# Patient Record
Sex: MALE | URBAN - METROPOLITAN AREA
[De-identification: names, ages, dates, MRNs, and addresses within clinical notes are randomized per-mention and may not be internally consistent; named-entity substitution may affect disease eponyms.]

---

## 2021-09-08 ENCOUNTER — OFFICE VISIT (OUTPATIENT)
Dept: URBAN - METROPOLITAN AREA CLINIC 98 | Facility: CLINIC | Age: 51
End: 2021-09-08
Payer: COMMERCIAL

## 2021-09-08 DIAGNOSIS — R10.11 RUQ ABDOMINAL PAIN: ICD-10-CM

## 2021-09-08 DIAGNOSIS — K52.9 CHRONIC DIARRHEA: ICD-10-CM

## 2021-09-08 DIAGNOSIS — K21.9 GERD WITHOUT ESOPHAGITIS: ICD-10-CM

## 2021-09-08 DIAGNOSIS — Z12.11 COLON CANCER SCREENING: ICD-10-CM

## 2021-09-08 PROCEDURE — 99203 OFFICE O/P NEW LOW 30 MIN: CPT | Performed by: INTERNAL MEDICINE

## 2021-09-08 PROCEDURE — 99243 OFF/OP CNSLTJ NEW/EST LOW 30: CPT | Performed by: INTERNAL MEDICINE

## 2021-09-08 RX ORDER — SULFAMETHOXAZOLE AND TRIMETHOPRIM 800; 160 MG/1; MG/1
1 TABLET TABLET ORAL TWICE A DAY
Status: ACTIVE | COMMUNITY

## 2021-09-08 RX ORDER — SODIUM SULFATE, MAGNESIUM SULFATE, AND POTASSIUM CHLORIDE 17.75; 2.7; 2.25 G/1; G/1; G/1
12 TABLETS TABLET ORAL
Qty: 24 TABLETS | Refills: 0 | OUTPATIENT
Start: 2021-09-08 | End: 2021-09-09

## 2021-09-08 NOTE — HPI-TODAY'S VISIT:
Patient referred by Poonam Dueñas NP for evaluation of chronic MARCELO and change in bowel pattern. Copy of this consult OV sent to Poonam Dueñas NP. 50 yo pt w/ chronic day and night time MARCELO, worst when off PPI ( partially controlled w/ OTC PPI ), w/o dysphagia /odynophagia and no melenic stools. No anorexia or weight loss. Has gained ~ 6 lbs / past few mos, No cardiorespiratory sxs. No edema nor WITT, SOB. Chronic loose, non-bloody stools, 2 to 3 /d, w/o nocturnal diarrhea, and occasional blood in stools, w/o melenic stools nor juliann hematochezia. Has no FHx CC / IBD / pp / GI malignancies. Normal labs 3 mos ago. Had COVID-19 12/20 and hasn't received COVID-19 vaccine. No other complaints.  No prior screening colonoscopy.

## 2021-09-20 ENCOUNTER — TELEPHONE ENCOUNTER (OUTPATIENT)
Dept: URBAN - METROPOLITAN AREA CLINIC 23 | Facility: CLINIC | Age: 51
End: 2021-09-20

## 2021-10-05 ENCOUNTER — OFFICE VISIT (OUTPATIENT)
Dept: URBAN - METROPOLITAN AREA CLINIC 97 | Facility: CLINIC | Age: 51
End: 2021-10-05
Payer: COMMERCIAL

## 2021-10-05 DIAGNOSIS — K76.0 FATTY (CHANGE OF) LIVER: ICD-10-CM

## 2021-10-05 PROCEDURE — 76705 ECHO EXAM OF ABDOMEN: CPT | Performed by: INTERNAL MEDICINE

## 2021-10-05 RX ORDER — SULFAMETHOXAZOLE AND TRIMETHOPRIM 800; 160 MG/1; MG/1
1 TABLET TABLET ORAL TWICE A DAY
Status: ACTIVE | COMMUNITY

## 2021-10-18 ENCOUNTER — CLAIMS CREATED FROM THE CLAIM WINDOW (OUTPATIENT)
Dept: URBAN - METROPOLITAN AREA CLINIC 4 | Facility: CLINIC | Age: 51
End: 2021-10-18
Payer: COMMERCIAL

## 2021-10-18 ENCOUNTER — OFFICE VISIT (OUTPATIENT)
Dept: URBAN - METROPOLITAN AREA SURGERY CENTER 18 | Facility: SURGERY CENTER | Age: 51
End: 2021-10-18
Payer: COMMERCIAL

## 2021-10-18 DIAGNOSIS — K90.0 CELIAC DISEASE: ICD-10-CM

## 2021-10-18 DIAGNOSIS — K29.60 ADENOPAPILLOMATOSIS GASTRICA: ICD-10-CM

## 2021-10-18 DIAGNOSIS — K90.1 SPRUE: ICD-10-CM

## 2021-10-18 DIAGNOSIS — K21.9 GASTRO-ESOPHAGEAL REFLUX DISEASE WITHOUT ESOPHAGITIS: ICD-10-CM

## 2021-10-18 DIAGNOSIS — K63.89 POLYP, HYPERPLASTIC: ICD-10-CM

## 2021-10-18 DIAGNOSIS — K63.5 BENIGN COLON POLYP: ICD-10-CM

## 2021-10-18 DIAGNOSIS — K21.9 ACID REFLUX: ICD-10-CM

## 2021-10-18 DIAGNOSIS — K29.40 CHRONIC ATROPHIC GASTRITIS WITHOUT BLEEDING: ICD-10-CM

## 2021-10-18 PROCEDURE — 45380 COLONOSCOPY AND BIOPSY: CPT | Performed by: INTERNAL MEDICINE

## 2021-10-18 PROCEDURE — 88342 IMHCHEM/IMCYTCHM 1ST ANTB: CPT | Performed by: PATHOLOGY

## 2021-10-18 PROCEDURE — 43239 EGD BIOPSY SINGLE/MULTIPLE: CPT | Performed by: INTERNAL MEDICINE

## 2021-10-18 PROCEDURE — 88305 TISSUE EXAM BY PATHOLOGIST: CPT | Performed by: PATHOLOGY

## 2021-10-18 PROCEDURE — G8907 PT DOC NO EVENTS ON DISCHARG: HCPCS | Performed by: INTERNAL MEDICINE

## 2021-10-18 PROCEDURE — 88312 SPECIAL STAINS GROUP 1: CPT | Performed by: PATHOLOGY

## 2022-01-25 ENCOUNTER — OFFICE VISIT (OUTPATIENT)
Dept: URBAN - METROPOLITAN AREA CLINIC 98 | Facility: CLINIC | Age: 52
End: 2022-01-25
Payer: COMMERCIAL

## 2022-01-25 ENCOUNTER — LAB OUTSIDE AN ENCOUNTER (OUTPATIENT)
Dept: URBAN - METROPOLITAN AREA CLINIC 98 | Facility: CLINIC | Age: 52
End: 2022-01-25

## 2022-01-25 DIAGNOSIS — K76.0 NAFLD (NONALCOHOLIC FATTY LIVER DISEASE): ICD-10-CM

## 2022-01-25 DIAGNOSIS — K57.90 DIVERTICULOSIS: ICD-10-CM

## 2022-01-25 DIAGNOSIS — K90.0 CELIAC SPRUE: ICD-10-CM

## 2022-01-25 DIAGNOSIS — K21.9 GERD WITHOUT ESOPHAGITIS: ICD-10-CM

## 2022-01-25 DIAGNOSIS — K64.2 GRADE III HEMORRHOIDS: ICD-10-CM

## 2022-01-25 PROCEDURE — 99214 OFFICE O/P EST MOD 30 MIN: CPT | Performed by: INTERNAL MEDICINE

## 2022-01-25 RX ORDER — PANTOPRAZOLE SODIUM 40 MG/1
1 TABLET TABLET, DELAYED RELEASE ORAL ONCE A DAY
Qty: 30 | Refills: 3 | OUTPATIENT
Start: 2022-01-25

## 2022-01-25 RX ORDER — SULFAMETHOXAZOLE AND TRIMETHOPRIM 800; 160 MG/1; MG/1
1 TABLET TABLET ORAL TWICE A DAY
Status: ACTIVE | COMMUNITY

## 2022-01-25 RX ORDER — ATORVASTATIN CALCIUM 20 MG/1
1 TABLET TABLET, FILM COATED ORAL ONCE A DAY
Status: ACTIVE | COMMUNITY

## 2022-01-25 NOTE — HPI-TODAY'S VISIT:
52 yo pt w/ chronic MARCELO, worst when off PPI ( partially controlled w/ OTC PPI ), w/o dysphagia /odynophagia and no melenic stools. EGD 10/21: GERD, sm HH chronic inactive Hp-negative gastritis and MARSH 3 duodenal bx's.  Colonoscopy 10/21: Diverticulosis and AC HP polyp. RUQ-US: fatty liver.  No anorexia or weight loss. Has gained ~ 6 lbs / past few mos, No cardiorespiratory sxs. He has been complaning of intermittent hemorrhoidal bleeding. No melenic stools and no use of ASA / NSAIDs. No edema nor WITT, SOB. Diarhea has resolved.  Had COVID-19 12/20 and hasn't received COVID-19 vaccine. No other complaints.

## 2022-01-26 ENCOUNTER — DASHBOARD ENCOUNTERS (OUTPATIENT)
Age: 52
End: 2022-01-26

## 2022-01-26 PROBLEM — 197315008: Status: ACTIVE | Noted: 2022-01-25

## 2022-01-26 PROBLEM — 266435005: Status: ACTIVE | Noted: 2021-09-08

## 2022-01-26 PROBLEM — 397881000: Status: ACTIVE | Noted: 2022-01-25

## 2022-01-26 PROBLEM — 396331005: Status: ACTIVE | Noted: 2022-01-25

## 2022-01-28 LAB
DQ ALPHA 1: (no result)
PERFORMING LAB: (no result)